# Patient Record
Sex: FEMALE | Race: WHITE | Employment: FULL TIME | URBAN - METROPOLITAN AREA
[De-identification: names, ages, dates, MRNs, and addresses within clinical notes are randomized per-mention and may not be internally consistent; named-entity substitution may affect disease eponyms.]

---

## 2017-01-03 ENCOUNTER — APPOINTMENT (OUTPATIENT)
Dept: PHYSICAL THERAPY | Age: 47
End: 2017-01-03
Payer: OTHER MISCELLANEOUS

## 2017-01-06 ENCOUNTER — HOSPITAL ENCOUNTER (OUTPATIENT)
Dept: PHYSICAL THERAPY | Age: 47
Discharge: HOME | End: 2017-01-06
Payer: OTHER MISCELLANEOUS

## 2017-01-06 PROCEDURE — 97110 THERAPEUTIC EXERCISES: CPT

## 2017-01-06 PROCEDURE — 97112 NEUROMUSCULAR REEDUCATION: CPT

## 2017-01-06 NOTE — PROGRESS NOTES
PT DAILY TREATMENT NOTE     Patient Name: Mikaela Garcia  Date:2017  : 1970  [x]  Patient  Verified  Payor: Yadiel Mattson [id number] / Plan: [id number] / Product Type: Workers Comp /    In time:11:08 am  Out time:12:12 pm  Total Treatment Time (min): 60  Visit #: 3 of 12    Treatment Area: Pain in right shoulder [M25.511]    SUBJECTIVE  Pain Level (0-10 scale): 3  Any medication changes, allergies to medications, adverse drug reactions, diagnosis change, or new procedure performed?: [x] No    [] Yes (see summary sheet for update)  Subjective functional status/changes:   [] No changes reported  \"I wasn;t really even that sore after last time. Work kind of aggravates it. \"    OBJECTIVE    Modality rationale: decrease inflammation and decrease pain to improve the patients ability to tolerate daily activities    Min Type Additional Details    [] Estim:  []Unatt       []IFC  []Premod                        []Other:  []w/ice   []w/heat  Position:  Location:    [] Estim: []Att    []TENS instruct  []NMES                    []Other:  []w/US   []w/ice   []w/heat  Position:  Location:    []  Traction: [] Cervical       []Lumbar                       [] Prone          []Supine                       []Intermittent   []Continuous Lbs:  [] before manual  [] after manual    []  Ultrasound: []Continuous   [] Pulsed                           []1MHz   []3MHz W/cm2:  Location:    []  Iontophoresis with dexamethasone         Location: [] Take home patch   [] In clinic   10 [x]  Ice     []  heat  []  Ice massage  []  Laser   []  Anodyne Position: right shoulder  Location: sitting    []  Laser with stim  []  Other:  Position:  Location:    []  Vasopneumatic Device Pressure:       [] lo [] med [] hi   Temperature: [] lo [] med [] hi   [] Skin assessment post-treatment:  []intact []redness- no adverse reaction    []redness - adverse reaction:     31 min Therapeutic Exercise: [x] See flow sheet : Rationale: increase ROM, increase strength, improve coordination and increase proprioception to improve the patients ability to perform daily activities with decreased pain and symptom levels         19 min Neuromuscular Re-education: [x] See flow sheet / see dry needling procedure note   Rationale: increase ROM and decrease pain and trigger points to improve the patients ability to perform daily activities with decreased pain and symptom levels     Dry Needling Procedure Note    Procedure: An intramuscular manual therapy (dry needling) and a neuro-muscular re-education treatment was done to deactivate myofascial trigger points with a 30 gauge filament needle under aseptic technique. Indications:  [x] Myofascial pain and dysfunction [] Muscled spasms  [x] Myalgia/myositis   [] Muscle cramps  [] Muscle imbalances  [] Temporomandibular Dysfunction  [] Other:    Chart reviewed for the following:  Constantine ASHLEY PT, DPT, have reviewed the medical history, summary list and precautions/contraindications for [employer].   TIME OUT performed immediately prior to start of procedure:  Constantine ASHLEY PT, DPT, have performed the following reviews on [employer] prior to the start of the session:      [x] Verified patient identification by name and date of birth    [x] Agreement on all muscles being treated was verified   [x] Purpose of dry needling, side effects, possible complications, risks and benefits were explained to the patient   [x] Procedure site(s) verified  [x] Patient was positioned for comfort and draped for privacy  [x] Informed Consent was signed (initial visit) and verified verbally (subsequent visits)  [x] Patient was instructed on the need to report the use of blood thinners and/or immunosuppressant medications  [x] How to respond to possible adverse effects of treatment  [x] Self treatment of post needling soreness: ice, heat (moist heat, heat wraps) and stretching  [x] Opportunity was given to ask any questions, all questions were answered            Time: 11:43 am  Date of procedure: 1/6/2017  Treatment: The following muscles were treated today with intramuscular dry needling  [] Left [] Right Masster  [] Left [] Right Temporalis  [] Left [] Right Zygomaticus Major / Minor  [] Left [] Right Lateral Pterygoid  [] Left [] Right Medial Pterygoid  [] Left [] Right Digastric Post / Anterior Belly  [] Left [] Right Sternocleidomastoid  [] Left [] Right Scalene Anterior / Medial / Posterior  [] Left [] Right Extra Laryngeal Muscles  [] Left [] Right Upper Trapezius  [] Left [] Right Middle Trapezius  [] Left [] Right Lower Trapezius  [] Left [] Right Oblique Capitis Inferior  [] Left [] Right Splenius Capitis / Cervicis  [] Left [] Right Semispinalis: Capitis / Cervicis  [] Left [] Right Multifidi / Rotatores Cervicis / Thoracic  [] Left [] Right Longissimus Thoracis / Illiocostalis  [] Left [] Right Levator Scapulae  [] Left [x] Right Supraspinatus / Infraspinatus  [] Left [] Right Teres Major / Minor  [] Left [] Right Rhomboids / Serratus posterior superior  [] Left [] Right Pectoralis Major / Minor  [] Left [] Right Serratus Anterior  [] Left [x] Right Latissimus Dorsi  [] Left [x] Right Subscapularis  [] Left [] Right Coracobrachialis  [] Left [] Right Biceps Brachii  [] Left [x] Right Deltoid: Anterior / Medial / Posterior  [] Left [] Right Brachialis  [] Left [] Right Triceps  [] Left [] Right Brachioradialis  [] Left [] Right Extensor Carpi Radialis Brevis / Extensor Carpi Radialis Longus    [] Left [] Right  Extensor digitorum  [] Left [] Right Supinator / Pronator Teres  [] Left [] Right Flexor Carpi Radialis/ Flexor Carpi Ulnaris   [] Left [] Right  Flexor Digitorum Superficialis/ Flexor Digitorum Profundus  [] Left [] Right Flexor Pollicis Longus / Flexor Pollicis Brevis / Palmaris Longus  [] Left [] Right Abductor Pollicis Longus / Abductor Pollicis Brevis  [] Left [] Right Opponens Pollicis / Adductor Pollicis  [] Left [] Right Dorsal / Palmar Interossei / Lumbricalis  [] Left [] Right Abductor Digiti Minimi / Opponens Digiti Minimi    Patient's response to today's treatment:  [x] Latent Twitch Response     [] Muscle relaxation [] Pain Relief  [x] Post needling soreness    [x] without complications  [] Increased Range of Motion   [] Decreased headaches    [] Decreased Tinnitus  [] Other:     Performed by: Yaakov Prader, PT, DPT              With   [] TE   [] TA   [] neuro   [] other: Patient Education: [x] Review HEP    [] Progressed/Changed HEP based on:   [] positioning   [] body mechanics   [] transfers   [] heat/ice application    [] other:      Other Objective/Functional Measures:   Very challenged with S/L ER    Increased soreness after needling  Significant twitch response in infraspinatus, lats and subscap. Pain Level (0-10 scale) post treatment: 5    ASSESSMENT/Changes in Function:   Pt reports having releif with needling, but has increase in pain with work. Patient will continue to benefit from skilled PT services to modify and progress therapeutic interventions, address functional mobility deficits, address ROM deficits, address strength deficits, analyze and address soft tissue restrictions, analyze and cue movement patterns, analyze and modify body mechanics/ergonomics, assess and modify postural abnormalities and instruct in home and community integration to attain remaining goals. []  See Plan of Care  []  See progress note/recertification  []  See Discharge Summary         Progress towards goals / Updated goals:  Short Term Goals: To be accomplished in 2 weeks:  1. Pt will report 25% reduction in pain to increase tolerance to daily activities. Eval:established baseline, pain max 5/10  Current: reported reduction in sx to pain level 2/10 today.      Long Term Goals: To be accomplished in 6 weeks:  1.  Pt will demonstrate ability reach behind back and tuck in shirt without pain.  Eval:unabl, IR functional T12-L1, @ 90* Abd 60* with pain  Current: NA      2. Pt will be able to reach overhead and lift >25 lbs to allow to return to work related activities. Eval: unable  Current: NA      3. 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   Eval:  Strength Right Left   Shoulder Flex p! 4   Abd p! 4   Scaption p! 4   IR 4+ 4+   ER 4+ 4+              Serratus Ant 4- 4   Middle Trap 4- 4   Lower Trap 4- 4   Abduction and scaption more painful than Flexion       Current: NA      PLAN  [x]  Upgrade activities as tolerated     [x]  Continue plan of care  []  Update interventions per flow sheet       []  Discharge due to:_  []  Other:_      Rigoberto Garza PT, DPT 1/6/2017  11:10 AM    Future Appointments  Date Time Provider [facility]   1/10/2017 9:00 AM Rigoberto Garza PT, DPT [facility]   1/13/2017 11:30 AM Rigoberto Garza PT, DPT [facility]   1/17/2017 9:00 AM Rigoberto Garza PT, DPT [facility]   1/20/2017 11:00 AM Rigoberto Garza PT, DPT [facility]   1/24/2017 9:00 AM Rigoberto Garza PT, DPT [facility]   1/27/2017 11:00 AM Rigoberto Garza PT, DPT [facility]   1/31/2017 9:00 AM Rigoberto Garza PT, DPT [facility]   2/3/2017 11:00 AM Rigoberto Garza PT, DPT [facility]

## 2017-01-10 ENCOUNTER — APPOINTMENT (OUTPATIENT)
Dept: PHYSICAL THERAPY | Age: 47
End: 2017-01-10
Payer: OTHER MISCELLANEOUS

## 2017-01-13 ENCOUNTER — HOSPITAL ENCOUNTER (OUTPATIENT)
Dept: PHYSICAL THERAPY | Age: 47
Discharge: HOME | End: 2017-01-13
Payer: OTHER MISCELLANEOUS

## 2017-01-13 PROCEDURE — 97110 THERAPEUTIC EXERCISES: CPT

## 2017-01-13 PROCEDURE — 97112 NEUROMUSCULAR REEDUCATION: CPT

## 2017-01-13 NOTE — PROGRESS NOTES
PT DAILY TREATMENT NOTE     Patient Name: Yamilka Lamas  Date:2017  : 1970  [x]  Patient  Verified  Payor: Yadiel Mattson [id number] / Plan: [id number] / Product Type: Workers Comp /    In time:11:36 am  Out time:12:28 pm  Total Treatment Time (min): 52  Visit #: 4 of 12    Treatment Area: Pain in right shoulder [M25.511]    SUBJECTIVE  Pain Level (0-10 scale): 1-2  Any medication changes, allergies to medications, adverse drug reactions, diagnosis change, or new procedure performed?: [x] No    [] Yes (see summary sheet for update)  Subjective functional status/changes:   [] No changes reported  \"It feels better. I can tell a difference with the needling. \"    OBJECTIVE    Modality rationale: decrease pain to improve the patients ability to tolerate daily activities    Min Type Additional Details    [] Estim:  []Unatt       []IFC  []Premod                        []Other:  []w/ice   []w/heat  Position:  Location:    [] Estim: []Att    []TENS instruct  []NMES                    []Other:  []w/US   []w/ice   []w/heat  Position:  Location:    []  Traction: [] Cervical       []Lumbar                       [] Prone          []Supine                       []Intermittent   []Continuous Lbs:  [] before manual  [] after manual    []  Ultrasound: []Continuous   [] Pulsed                           []1MHz   []3MHz W/cm2:  Location:    []  Iontophoresis with dexamethasone         Location: [] Take home patch   [] In clinic    []  Ice     []  heat  []  Ice massage  []  Laser   []  Anodyne Position:  Location:    []  Laser with stim  []  Other:  Position:  Location:    []  Vasopneumatic Device Pressure:       [] lo [] med [] hi   Temperature: [] lo [] med [] hi   [] Skin assessment post-treatment:  []intact []redness- no adverse reaction    []redness - adverse reaction:        24 min Therapeutic Exercise: [x] See flow sheet :   Rationale: increase ROM, increase strength, improve coordination and increase proprioception to improve the patients ability to perform daily activities with decreased pain and symptom levels          18 min Neuromuscular Re-education: [x] See flow sheet / see dry needling procedure note   Rationale: increase ROM and decrease pain and trigger points to improve the patients ability to perform daily activities with decreased pain and symptom levels    Dry Needling Procedure Note    Procedure: An intramuscular manual therapy (dry needling) and a neuro-muscular re-education treatment was done to deactivate myofascial trigger points with a 30 gauge filament needle under aseptic technique. Indications:  [x] Myofascial pain and dysfunction [] Muscled spasms  [x] Myalgia/myositis   [] Muscle cramps  [] Muscle imbalances  [] Temporomandibular Dysfunction  [] Other:    Chart reviewed for the following:  Nazia ASHLEY PT, ELISSAT, have reviewed the medical history, summary list and precautions/contraindications for [employer].   TIME OUT performed immediately prior to start of procedure:  Nazia ASHLEY PT, ELISSAT, have performed the following reviews on [employer] prior to the start of the session:      [x] Verified patient identification by name and date of birth    [x] Agreement on all muscles being treated was verified   [x] Purpose of dry needling, side effects, possible complications, risks and benefits were explained to the patient   [x] Procedure site(s) verified  [x] Patient was positioned for comfort and draped for privacy  [x] Informed Consent was signed (initial visit) and verified verbally (subsequent visits)  [x] Patient was instructed on the need to report the use of blood thinners and/or immunosuppressant medications  [x] How to respond to possible adverse effects of treatment  [x] Self treatment of post needling soreness: ice, heat (moist heat, heat wraps) and stretching  [x] Opportunity was given to ask any questions, all questions were answered Time: 12:00 pm  Date of procedure: 1/13/2017  Treatment: The following muscles were treated today with intramuscular dry needling  [] Left [] Right Masster  [] Left [] Right Temporalis  [] Left [] Right Zygomaticus Major / Minor  [] Left [] Right Lateral Pterygoid  [] Left [] Right Medial Pterygoid  [] Left [] Right Digastric Post / Anterior Belly  [] Left [] Right Sternocleidomastoid  [] Left [] Right Scalene Anterior / Medial / Posterior  [] Left [] Right Extra Laryngeal Muscles  [] Left [x] Right Upper Trapezius  [] Left [] Right Middle Trapezius  [] Left [] Right Lower Trapezius  [] Left [] Right Oblique Capitis Inferior  [] Left [] Right Splenius Capitis / Cervicis  [] Left [] Right Semispinalis: Capitis / Cervicis  [] Left [] Right Multifidi / Rotatores Cervicis / Thoracic  [] Left [] Right Longissimus Thoracis / Illiocostalis  [] Left [] Right Levator Scapulae  [] Left [x] Right Supraspinatus / Infraspinatus  [] Left [] Right Teres Major / Minor  [] Left [] Right Rhomboids / Serratus posterior superior  [] Left [] Right Pectoralis Major / Minor  [] Left [] Right Serratus Anterior  [] Left [x] Right Latissimus Dorsi  [] Left [x] Right Subscapularis - axillary  [] Left [] Right Coracobrachialis  [] Left [] Right Biceps Brachii  [] Left [] Right Deltoid: Anterior / Medial / Posterior  [] Left [] Right Brachialis  [] Left [] Right Triceps  [] Left [] Right Brachioradialis  [] Left [] Right Extensor Carpi Radialis Brevis / Extensor Carpi Radialis Longus    [] Left [] Right  Extensor digitorum  [] Left [] Right Supinator / Pronator Teres  [] Left [] Right Flexor Carpi Radialis/ Flexor Carpi Ulnaris   [] Left [] Right  Flexor Digitorum Superficialis/ Flexor Digitorum Profundus  [] Left [] Right Flexor Pollicis Longus / Flexor Pollicis Brevis / Palmaris Longus  [] Left [] Right Abductor Pollicis Longus / Abductor Pollicis Brevis  [] Left [] Right Opponens Pollicis / Adductor Pollicis  [] Left [] Right Dorsal / Palmar Interossei / Lumbricalis  [] Left [] Right Abductor Digiti Minimi / Opponens Digiti Minimi    Patient's response to today's treatment:  [x] Latent Twitch Response     [] Muscle relaxation [] Pain Relief  [x] Post needling soreness    [x] without complications  [] Increased Range of Motion   [] Decreased headaches    [] Decreased Tinnitus  [] Other:     Performed by: Grace Johnson, PT, DPT                With   [] TE   [] TA   [] neuro   [] other: Patient Education: [x] Review HEP    [] Progressed/Changed HEP based on:   [] positioning   [] body mechanics   [] transfers   [] heat/ice application    [] other:      Other Objective/Functional Measures:   Functional IR on Right T10   Most twitch response in lat and subscap. Increased post needling soreness    Pain Level (0-10 scale) post treatment: 3    ASSESSMENT/Changes in Function:   Pt appears to be responding well to needling. At present reports feeling weak. Will progress exercises and add HEP next week. Patient will continue to benefit from skilled PT services to modify and progress therapeutic interventions, address functional mobility deficits, address ROM deficits, address strength deficits, analyze and address soft tissue restrictions, analyze and cue movement patterns, analyze and modify body mechanics/ergonomics, assess and modify postural abnormalities and instruct in home and community integration to attain remaining goals. []  See Plan of Care  []  See progress note/recertification  []  See Discharge Summary         Progress towards goals / Updated goals:  Short Term Goals: To be accomplished in 2 weeks:  1. Pt will report 25% reduction in pain to increase tolerance to daily activities. Eval:established baseline, pain max 5/10  Current: reported reduction in sx to pain level 2/10 today.      Long Term Goals: To be accomplished in 6 weeks:  1. Pt will demonstrate ability reach behind back and tuck in shirt without pain.   Eval:unabl, IR functional T12-L1, @ 90* Abd 60* with pain  Current: IR: T10-11      2. Pt will be able to reach overhead and lift >25 lbs to allow to return to work related activities. Eval: unable  Current: NA      3. 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   Eval:  Strength Right Left   Shoulder Flex p! 4   Abd p! 4   Scaption p! 4   IR 4+ 4+   ER 4+ 4+              Serratus Ant 4- 4   Middle Trap 4- 4   Lower Trap 4- 4   Abduction and scaption more painful than Flexion       Current: NA         PLAN  [x]  Upgrade activities as tolerated     [x]  Continue plan of care  []  Update interventions per flow sheet       []  Discharge due to:_  []  Other:_      Peter Post PT, DPT 1/13/2017  11:50 AM    Future Appointments  Date Time Provider [facility]   1/17/2017 9:00 AM Peter Post PT, DPT [facility]   1/20/2017 11:00 AM Peter Post PT, DPT [facility]   1/24/2017 9:00 AM Peter Post PT, DPT [facility]   1/27/2017 11:00 AM Peter Post PT, DPT [facility]   1/31/2017 9:00 AM Peter Post PT, DPT [facility]   2/3/2017 11:00 AM Peter Post PT, DPT [facility]

## 2017-01-17 NOTE — PROGRESS NOTES
[facility] at the [address], [place], [address] [facility]  Phone: [phone]      Fax:  [phone]    Progress Note  Patient name: Radha Zamora Start of Care: 16   Referral source: Nima Harrison MD : 1970   Medical/Treatment Diagnosis: Pain in right shoulder [M25.511] Onset Date:2016     Prior Hospitalization: see medical history Provider#: [id number]   Medications: Verified on Patient Summary List    Comorbidities: previous cancer  Prior Level of Function: unrestricted and unlimited in ADLs, work and recreational activities    Visits from Start of Care: 4    Missed Visits: 3    Progress Toward Goals:  Short Term Goals: To be accomplished in 2 weeks:  1. Pt will report 25% reduction in pain to increase tolerance to daily activities. Eval:established baseline, pain max 5/10  Current: reported reduction in sx to pain level 2/10 today.      Long Term Goals: To be accomplished in 6 weeks:  1. Pt will demonstrate ability reach behind back and tuck in shirt without pain. Eval:unabl, IR functional T12-L1, @ 90* Abd 60* with pain  Current: IR: T10-11      2. Pt will be able to reach overhead and lift >25 lbs to allow to return to work related activities. Eval: unable  Current: NA      3. 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 Eval:  Strength Right Left   Shoulder Flex p! 4   Abd p! 4   Scaption p! 4   IR 4+ 4+   ER 4+ 4+              Serratus Ant 4- 4   Middle Trap 4- 4   Lower Trap 4- 4   Abduction and scaption more painful than Flexion       Current: NA    Key Functional Changes:   Pt has been seen for 4 visits including initial evaluation. Per pt report and objective pt was responding very well to trigger point dry needling. Had treated with it for 4 sessions. Pt was reporting decreased pain levels, increased tolerance to daily and work activities.  Was starting to express that wanted to initiate strengthening exercises. Pt has episode following last needling session where next day had increased redness and swelling at right shoulder. Pt diagnosed with infection at [facility].   Unsure as to cause of infection as only sterile, individually packaged needles are used. Updated Goals: to be achieved in 4 weeks:  Same as above    ASSESSMENT/RECOMMENDATIONS:  []Continue therapy per initial plan/protocol at a frequency of   x per week for  weeks  []Continue therapy with the following recommended changes:_____________________      _____________________________________________________________________  []Discontinue therapy progressing towards or have reached established goals  []Discontinue therapy due to lack of appreciable progress towards goals  []Discontinue therapy due to lack of attendance or compliance  []Await Physician's recommendations/decisions regarding therapy  [x]Other:__Please advise______________________________________________________________    Thank you for this referral.   Yaakov Prader, PT, DPT 1/17/2017 2:20 PM  NOTE TO PHYSICIAN:  PLEASE COMPLETE THE ORDERS BELOW AND   FAX TO [facility]: [phone]  If you are unable to process this request in 24 hours please contact our office: [phone]        []  I have read the above report and request that my patient continue as recommended. []  I have read the above report and request that my patient continue therapy with the following changes/special instructions:________________________________________  []I have read the above report and request that my patient be discharged from therapy.     Physicians signature: ______________________________Date: ______Time:______

## 2017-01-27 ENCOUNTER — APPOINTMENT (OUTPATIENT)
Dept: PHYSICAL THERAPY | Age: 47
End: 2017-01-27
Payer: OTHER MISCELLANEOUS

## 2017-01-31 ENCOUNTER — APPOINTMENT (OUTPATIENT)
Dept: PHYSICAL THERAPY | Age: 47
End: 2017-01-31
Payer: OTHER MISCELLANEOUS

## 2017-02-03 ENCOUNTER — APPOINTMENT (OUTPATIENT)
Dept: PHYSICAL THERAPY | Age: 47
End: 2017-02-03

## 2017-04-25 ENCOUNTER — HOSPITAL ENCOUNTER (EMERGENCY)
Age: 47
Discharge: HOME | End: 2017-04-25
Attending: FAMILY MEDICINE | Admitting: FAMILY MEDICINE
Payer: OTHER MISCELLANEOUS

## 2017-04-25 VITALS
OXYGEN SATURATION: 100 % | SYSTOLIC BLOOD PRESSURE: 135 MMHG | WEIGHT: 195 LBS | RESPIRATION RATE: 20 BRPM | TEMPERATURE: 98.3 F | HEIGHT: 70 IN | BODY MASS INDEX: 27.92 KG/M2 | HEART RATE: 115 BPM | DIASTOLIC BLOOD PRESSURE: 91 MMHG

## 2017-04-25 DIAGNOSIS — L03.113 CELLULITIS OF ARM, RIGHT: Primary | ICD-10-CM

## 2017-04-25 PROCEDURE — L3670 SO ACRO/CLAV CAN WEB PRE OTS: HCPCS

## 2017-04-25 PROCEDURE — 99282 EMERGENCY DEPT VISIT SF MDM: CPT

## 2017-04-25 PROCEDURE — 87040 BLOOD CULTURE FOR BACTERIA: CPT | Performed by: PHYSICIAN ASSISTANT

## 2017-04-25 RX ORDER — SULFAMETHOXAZOLE AND TRIMETHOPRIM 800; 160 MG/1; MG/1
1 TABLET ORAL 2 TIMES DAILY
Qty: 20 TAB | Refills: 0 | Status: SHIPPED | OUTPATIENT
Start: 2017-04-25 | End: 2017-05-05

## 2017-04-25 RX ORDER — CEPHALEXIN 500 MG/1
500 CAPSULE ORAL 4 TIMES DAILY
Qty: 40 CAP | Refills: 0 | Status: SHIPPED | OUTPATIENT
Start: 2017-04-25 | End: 2017-05-05

## 2017-04-25 NOTE — ED TRIAGE NOTES
Patient with long standing work injury to the right shoulder. Patient states that she had dry needle therapy a few months ago and developed a skin infection. Patient was treated with a po course of antibiotics. Patient states that she had a deep tissue massage yesterday and started with pain, swelling and a rash to the right shoulder and trapezius muscle area.

## 2017-04-25 NOTE — LETTER
[facility] [place] 
[facility] EMERGENCY DEPT 
[address] [address] 
[phone] Lauro Stewart Date: 4/25/2017 Sex: female [address] [address] YOB: 1970 Age: 42 y.o. Occupational Medicine Return to Work Form          Date of Treatment:  4/25/2017 Diagnosis: ICD-10-CM ICD-9-CM 1. Cellulitis of arm, right L03.113 682.3 Instructions:  Employee must return copy of this report to Personnel and/or Supervisor. Patient Identification - Company Protocol:   
   Checked & Followed   Not Available PART I:  Check Treatment X-ray     Lab    Discharge Instructions Given    Rx Given Non-Prescription Meds    Sutures       EKG Other (Comment):   
 
Part II:  Disposition May Return to Regular Work Without Restrictions May Return to Restricted Duty as Below Explanation of RESTRICTIONS (Comment): May NOT Return to Work until cleared by Referral Specialist or Occupational Medicine MD 
 
Part III:  Follow-Up Follow-up Information Follow up With Details Comments Contact Info [facility] OCCUPATIONAL MED Schedule an appointment as soon as possible for a visit  [address] [place] [address] [address] [place] [address] 
[phone] [facility] EMERGENCY DEPT  As needed, If symptoms worsen [address] [place] [address] [phone] Part IV: Was Workers Comp Supervisor Notified     Yes     No 
 
Lauro Stewart was seen in the Emergency Department on 4/25/2017 by the following provider(s): 
Attending Provider: Adelina Garcia MD 
Physician Assistant: ERICKA Bennett; ED Nurse: PLEASE CALL CASE FACILITATOR, OCCUPATIONAL MEDICINE  
[phone] TO SCHEDULE AN APPOINTMENT Referral Physicians: Madeline Torres MD 
[address]. [address] [facility]. [place], [address] [address] [facility], [address] Phone:  [phone]; Fax:  [phone] ORTHOPEDICS [address] [facility] [facility] [facility]. [address]    [facility], [address] [place] [address] 
[phone] [facility] [address] [facility], [facility] [address] 
[phone]

## 2017-04-25 NOTE — DISCHARGE INSTRUCTIONS

## 2017-04-25 NOTE — ED PROVIDER NOTES
HPI Comments:   12:16 PM     Elia Moran is a 42 y.o. female who presents to the ED c/o rash to right shoulder onset yesterday. She reports she had a right shoulder injury 7 months ago at work. Pt states she had dry needle therapy, developed an infection, was Rx'd Bactrim for 10 days, and her sx resolved. Pt reports last night she had a deep tissue massage (without hot stones) and the rash returned but is not severe as it was since being treated. Pt reports her shoulder is now \"clicking. \" Pt states she has initially followed by [facility] Occupational Medicine and was referred to Dr. Charlynn Soulier ([address]) whom she has followed up with. PMHx of cervical CA. Pt denies fever and any other symptoms or complaints. The history is provided by the patient. No  was used. Past Medical History:   Diagnosis Date    Cervical cancer [facility])        Past Surgical History:   Procedure Laterality Date    HX APPENDECTOMY      HX HYSTERECTOMY           History reviewed. No pertinent family history. Social History     Social History    Marital status: SINGLE     Spouse name: N/A    Number of children: N/A    Years of education: N/A     Occupational History    Not on file. Social History Main Topics    Smoking status: Never Smoker    Smokeless tobacco: Not on file    Alcohol use Yes      Comment: Occasionally    Drug use: Not on file    Sexual activity: Yes     Birth control/ protection: Surgical     Other Topics Concern    Not on file     Social History Narrative         ALLERGIES: Review of patient's allergies indicates no known allergies. Review of Systems   Constitutional: Negative for fever. Skin: Positive for rash. All other systems reviewed and are negative.       Vitals:    04/25/17 1152   BP: (!) 135/91   Pulse: (!) 115   Resp: 20   Temp: 98.3 °F (36.8 °C)   SpO2: 100%   Weight: 88.5 kg (195 lb)   Height: 5' 10\" (1.778 m)            Physical Exam   Constitutional: She is oriented to person, place, and time. She appears well-developed and well-nourished. No distress. HENT:   Head: Normocephalic and atraumatic. Cardiovascular: Normal rate, regular rhythm and normal heart sounds. Exam reveals no gallop and no friction rub. No murmur heard. Pulmonary/Chest: Effort normal and breath sounds normal. No respiratory distress. She has no wheezes. Musculoskeletal:        Right shoulder: She exhibits decreased range of motion, tenderness and pain. She exhibits no bony tenderness, no swelling and no spasm. Neurological: She is alert and oriented to person, place, and time. Skin: Skin is warm and dry. Rash noted. She is not diaphoretic. Patchy erythema and warmth on back of right neck and right upper arm. Psychiatric: She has a normal mood and affect. Her behavior is normal.   Nursing note and vitals reviewed. RESULTS:    No orders to display        Labs Reviewed   CULTURE, BLOOD       No results found for this or any previous visit (from the past 12 hour(s)). MDM  Number of Diagnoses or Management Options  Cellulitis of arm, right:   Diagnosis management comments: Unsure if this represents cellulitis or local reaction from massage yesterday but patient states she improved previously with these Sx while on Bactrim. Will put on abx and refer back to Ortho and Occ Med. ED Course     MEDICATIONS GIVEN:  Medications - No data to display     Procedures      PROGRESS NOTE:  12:16 PM  Initial assessment performed. DISCHARGE NOTE:  12:23 PM   Althea Mooney's  results have been reviewed with her. She has been counseled regarding her diagnosis, treatment, and plan. She verbally conveys understanding and agreement of the signs, symptoms, diagnosis, treatment and prognosis and additionally agrees to follow up as discussed. She also agrees with the care-plan and conveys that all of her questions have been answered.   I have also provided discharge instructions for her that include: educational information regarding their diagnosis and treatment, and list of reasons why they would want to return to the ED prior to their follow-up appointment, should her condition change. The patient and/or family has been provided with education for proper Emergency Department utilization. CLINICAL IMPRESSION:    1. Cellulitis of arm, right        PLAN: DISCHARGE HOME    Follow-up Information     Follow up With Details Comments Contact Info    [address] [facility] Schedule an appointment as soon as possible for a visit  [address] [place] [address] [address]  Zeny Orozco  [phone]    Derek Haji MD Schedule an appointment as soon as possible for a visit Follow up with orthopedics [address] [facility] [address]  [phone]      [facility] EMERGENCY DEPT  As needed, If symptoms worsen [address] [address]  [phone]          Current Discharge Medication List      START taking these medications    Details   trimethoprim-sulfamethoxazole (BACTRIM DS) 160-800 mg per tablet Take 1 Tab by mouth two (2) times a day for 10 days. Qty: 20 Tab, Refills: 0      cephALEXin (KEFLEX) 500 mg capsule Take 1 Cap by mouth four (4) times daily for 10 days. Qty: 40 Cap, Refills: 0             ATTESTATIONS:  This note is prepared by Keli Ordonez, acting as Scribe for Mitchell Mclaughlin PA-C . Mitchell Mclaughlin PA-C: The scribe's documentation has been prepared under my direction and personally reviewed by me in its entirety. I confirm that the note above accurately reflects all work, treatment, procedures, and medical decision making performed by me.

## 2017-04-25 NOTE — ED NOTES
Patient armband removed and shredded. I have reviewed discharge instructions with the patient. The patient verbalized understanding. Rx x2 sent electronically to pharmacy of choice, verbalized understanding.

## 2017-05-01 LAB
BACTERIA SPEC CULT: NORMAL
SERVICE CMNT-IMP: NORMAL

## 2018-10-20 ENCOUNTER — APPOINTMENT (OUTPATIENT)
Dept: CT IMAGING | Age: 48
End: 2018-10-20
Attending: PHYSICIAN ASSISTANT
Payer: COMMERCIAL

## 2018-10-20 ENCOUNTER — HOSPITAL ENCOUNTER (EMERGENCY)
Age: 48
Discharge: HOME | End: 2018-10-20
Attending: EMERGENCY MEDICINE
Payer: COMMERCIAL

## 2018-10-20 VITALS
OXYGEN SATURATION: 100 % | TEMPERATURE: 97.7 F | SYSTOLIC BLOOD PRESSURE: 149 MMHG | WEIGHT: 179 LBS | BODY MASS INDEX: 25.68 KG/M2 | DIASTOLIC BLOOD PRESSURE: 89 MMHG | RESPIRATION RATE: 16 BRPM | HEART RATE: 70 BPM

## 2018-10-20 DIAGNOSIS — G44.82 HEADACHE ASSOCIATED WITH SEXUAL ACTIVITY: Primary | ICD-10-CM

## 2018-10-20 PROCEDURE — 96374 THER/PROPH/DIAG INJ IV PUSH: CPT

## 2018-10-20 PROCEDURE — 74011250637 HC RX REV CODE- 250/637: Performed by: PHYSICIAN ASSISTANT

## 2018-10-20 PROCEDURE — 70450 CT HEAD/BRAIN W/O DYE: CPT

## 2018-10-20 PROCEDURE — 96361 HYDRATE IV INFUSION ADD-ON: CPT

## 2018-10-20 PROCEDURE — 74011250636 HC RX REV CODE- 250/636: Performed by: PHYSICIAN ASSISTANT

## 2018-10-20 PROCEDURE — 96375 TX/PRO/DX INJ NEW DRUG ADDON: CPT

## 2018-10-20 PROCEDURE — 99283 EMERGENCY DEPT VISIT LOW MDM: CPT

## 2018-10-20 RX ORDER — KETOROLAC TROMETHAMINE 30 MG/ML
30 INJECTION, SOLUTION INTRAMUSCULAR; INTRAVENOUS
Status: COMPLETED | OUTPATIENT
Start: 2018-10-20 | End: 2018-10-20

## 2018-10-20 RX ORDER — BUTALBITAL, ACETAMINOPHEN AND CAFFEINE 300; 40; 50 MG/1; MG/1; MG/1
1 CAPSULE ORAL
Qty: 10 CAP | Refills: 0 | Status: SHIPPED | OUTPATIENT
Start: 2018-10-20 | End: 2018-10-20

## 2018-10-20 RX ORDER — DIPHENHYDRAMINE HYDROCHLORIDE 50 MG/ML
25 INJECTION, SOLUTION INTRAMUSCULAR; INTRAVENOUS
Status: COMPLETED | OUTPATIENT
Start: 2018-10-20 | End: 2018-10-20

## 2018-10-20 RX ORDER — PROCHLORPERAZINE EDISYLATE 5 MG/ML
10 INJECTION INTRAMUSCULAR; INTRAVENOUS
Status: COMPLETED | OUTPATIENT
Start: 2018-10-20 | End: 2018-10-20

## 2018-10-20 RX ORDER — BUTALBITAL, ACETAMINOPHEN AND CAFFEINE 300; 40; 50 MG/1; MG/1; MG/1
1 CAPSULE ORAL
Qty: 10 CAP | Refills: 0 | Status: SHIPPED | OUTPATIENT
Start: 2018-10-20

## 2018-10-20 RX ORDER — BUTALBITAL, ACETAMINOPHEN AND CAFFEINE 300; 40; 50 MG/1; MG/1; MG/1
1 CAPSULE ORAL
Status: COMPLETED | OUTPATIENT
Start: 2018-10-20 | End: 2018-10-20

## 2018-10-20 RX ADMIN — BUTALBITAL, ACETAMINOPHEN, AND CAFFEINE 1 CAPSULE: 50; 300; 40 CAPSULE ORAL at 21:19

## 2018-10-20 RX ADMIN — SODIUM CHLORIDE 1000 ML: 900 INJECTION, SOLUTION INTRAVENOUS at 20:04

## 2018-10-20 RX ADMIN — DIPHENHYDRAMINE HYDROCHLORIDE 25 MG: 50 INJECTION, SOLUTION INTRAMUSCULAR; INTRAVENOUS at 20:04

## 2018-10-20 RX ADMIN — KETOROLAC TROMETHAMINE 30 MG: 30 INJECTION, SOLUTION INTRAMUSCULAR at 20:04

## 2018-10-20 RX ADMIN — PROCHLORPERAZINE EDISYLATE 10 MG: 5 INJECTION INTRAMUSCULAR; INTRAVENOUS at 20:04

## 2018-10-21 NOTE — ED PROVIDER NOTES
EMERGENCY DEPARTMENT HISTORY AND PHYSICAL EXAM 
 
9:18 PM 
 
 
Date: 10/20/2018 Patient Name: Raleigh Bermudez History of Presenting Illness Chief Complaint Patient presents with  
 Headache  Neck Pain History Provided By: Patient Chief Complaint: headache Duration: 1 Hours Timing:  Intermittent Location: diffuse Quality: Aching Severity: 10 out of 10 Modifying Factors:  
Associated Symptoms: nausea and vomiting Additional History (Context): Raleigh Bermudez is a 57 y.o. female with No significant past medical history who presents with headache. She said it has been occurring over the past few days and typically starts after sexually activity. Today she says they were not having sex but were kissing when the headache suddenly started. She has history of headaches but this seems more severe to her. She has nausea and vomiting. The pain extends down into her neck. She also feels like her legs went weak and numb when the headache started. She denies vision changes, fever, dizziness. PCP: None Current Facility-Administered Medications Medication Dose Route Frequency Provider Last Rate Last Dose  butalbital-acetaminophen-caff (FIORICET) per capsule 1 Cap  1 Cap Oral NOW Toni Harmon PA-C Current Outpatient Medications Medication Sig Dispense Refill  butalbital-acetaminophen-caff (FIORICET) -40 mg per capsule Take 1 Cap by mouth every four (4) hours as needed for Pain. 10 Cap 0 Past History Past Medical History: 
Past Medical History:  
Diagnosis Date  Cervical cancer ([address] Past Surgical History: 
Past Surgical History:  
Procedure Laterality Date  HX APPENDECTOMY  HX HYSTERECTOMY Family History: No family history on file. Social History: 
Social History Tobacco Use  Smoking status: Never Smoker Substance Use Topics  Alcohol use: Yes Comment: Occasionally  Drug use: Not on file Allergies: 
No Known Allergies Review of Systems Review of Systems Constitutional: Negative for fever. HENT: Negative for facial swelling. Eyes: Negative for visual disturbance. Respiratory: Negative for shortness of breath. Cardiovascular: Negative for chest pain. Gastrointestinal: Positive for nausea and vomiting. Negative for abdominal pain. Genitourinary: Negative for dysuria. Musculoskeletal: Negative for neck pain. Skin: Negative for rash. Neurological: Positive for weakness, numbness and headaches. Negative for dizziness and light-headedness. Psychiatric/Behavioral: Negative for confusion. All other systems reviewed and are negative. Physical Exam  
 
Visit Vitals /89 (BP 1 Location: Left arm) Pulse 70 Temp 97.7 °F (36.5 °C) Resp 16 Wt 81.2 kg (179 lb) SpO2 100% BMI 25.68 kg/m² Physical Exam  
Constitutional: She is oriented to person, place, and time. She appears well-developed and well-nourished. She appears distressed. HENT:  
Head: Normocephalic and atraumatic. Eyes: Conjunctivae are normal.  
Neck: Normal range of motion. Cardiovascular: Normal rate and regular rhythm. Pulmonary/Chest: Effort normal.  
Abdominal: She exhibits no distension. Musculoskeletal: Normal range of motion. Tenderness to cervical paraspinous muscles. No tenderness to C-spine. Ful lROM. Neurological: She is alert and oriented to person, place, and time. She has normal strength. No cranial nerve deficit or sensory deficit. She displays a negative Romberg sign. Coordination and gait normal.  
Skin: Skin is warm and dry. She is not diaphoretic. Psychiatric: She has a normal mood and affect. Nursing note and vitals reviewed. Diagnostic Study Results Labs - No results found for this or any previous visit (from the past 12 hour(s)). Radiologic Studies -  
CT HEAD WO CONT Final Result Ct Head Wo Cont Result Date: 10/20/2018 CT Of The Head Without Contrast CPT CODE: 53418 HISTORY: Headache, acute, severe COMPARISON: None TECHNIQUE:  Axial CT images of the head from the skull base to the vertex without IV contrast. CT dose reduction was achieved through use of a standardized protocol tailored for this examination and automatic exposure control for dose modulation. FINDINGS: No acute intracranial hemorrhage. No abnormal extra-axial fluid collection over the convexities. CSF spaces are normal size for patient age. No hydrocephalus or brain attenuations are normal. No acute or chronic infarct. No visible mass or midline shift. Sinuses are clear to level imaged. No mastoid or middle ear fluid. Bones appear normal.  
 
IMPRESSION: No acute intracranial abnormality. No findings of acute infarct, acute hemorrhage, or mass. Medical Decision Making I am the first provider for this patient. I reviewed the vital signs, available nursing notes, past medical history, past surgical history, family history and social history. Vital Signs-Reviewed the patient's vital signs. Records Reviewed: Nursing Notes (Time of Review: 9:18 PM) ED Course: Progress Notes, Reevaluation, and Consults: 
9:28 PM 
CT negative. HA improved to 3/10. Patient is resting comfortably. Will give one dose of PO medication and discharge. No concerns for intracranial bleed, symptoms improved rapidly with treatment and CT is negative. No meningeal signs. Discussed treatment plan, return precautions, symptomatic relief, and expected time to improvement. All questions answered. Patient is stable for discharge and outpatient management. Provider Notes (Medical Decision Making): MDM Number of Diagnoses or Management Options Diagnosis management comments: 55yo F with sudden onset headache a/w nausea, vomiting, subjective weakness and numbness.   No neuro deficits on exam.  In distress due to pain. Vitals stable. Afebrile. Diagnosis Clinical Impression: 1. Headache associated with sexual activity Disposition:  
 
Follow-up Information Follow up With Specialties Details Why Contact Info [facility] EMERGENCY DEPT Emergency Medicine  Immediately if symptoms worsen [address] Medication List  
  
START taking these medications   
butalbital-acetaminophen-caff -40 mg per capsule Commonly known as:  [employer] Take 1 Cap by mouth every four (4) hours as needed for Pain. Where to Get Your Medications These [facility] [place] [id number] [place] [address] & [address] [place] [address] [facility] [address] Phone:  [phone] · butalbital-acetaminophen-caff -40 mg per capsule 
  
 
_______________________________ Attestations: 
Scribe Attestation Toni WILBER Harmon PA-C acting as a scribe for and in the presence of JUNAID/Pankaj, [place] October 20, 2018 at 9:30 PM 
    
Provider Attestation:     
I personally performed the services described in the documentation, reviewed the documentation, as recorded by the scribe in my presence, and it accurately and completely records my words and actions. October 20, 2018 at 9:30 PM - VERO Moran 
_______________________________

## 2018-10-21 NOTE — DISCHARGE INSTRUCTIONS

## 2018-10-21 NOTE — ED NOTES
I have reviewed discharge instructions with the patient. The patient verbalized understanding. Patient provided prescription for Fioricet and instructed on use. Patient leaving the ED in stable condition at this time, steady gait noted.

## 2019-01-18 NOTE — ED TRIAGE NOTES
\"For the past 4 days I've been having a terrible headaches after having sex. Today after having sex I started having a really bad headache with neck pain and both of my leg went numb and weak\"
LR